# Patient Record
Sex: FEMALE | Race: WHITE | NOT HISPANIC OR LATINO | Employment: FULL TIME | URBAN - METROPOLITAN AREA
[De-identification: names, ages, dates, MRNs, and addresses within clinical notes are randomized per-mention and may not be internally consistent; named-entity substitution may affect disease eponyms.]

---

## 2018-04-22 LAB — RAPID GROUP A STREP (OHS): NEGATIVE

## 2019-03-16 LAB
RAPID GROUP A STREP (OHS): NEGATIVE
RAPID GROUP A STREP (OHS): NEGATIVE

## 2022-04-11 ENCOUNTER — HISTORICAL (OUTPATIENT)
Dept: ADMINISTRATIVE | Facility: HOSPITAL | Age: 47
End: 2022-04-11

## 2022-04-27 VITALS
DIASTOLIC BLOOD PRESSURE: 66 MMHG | OXYGEN SATURATION: 100 % | WEIGHT: 121.5 LBS | BODY MASS INDEX: 24.49 KG/M2 | HEIGHT: 59 IN | SYSTOLIC BLOOD PRESSURE: 100 MMHG

## 2022-09-21 ENCOUNTER — HISTORICAL (OUTPATIENT)
Dept: ADMINISTRATIVE | Facility: HOSPITAL | Age: 47
End: 2022-09-21

## 2022-09-22 ENCOUNTER — HISTORICAL (OUTPATIENT)
Dept: ADMINISTRATIVE | Facility: HOSPITAL | Age: 47
End: 2022-09-22

## 2023-02-20 ENCOUNTER — OFFICE VISIT (OUTPATIENT)
Dept: URGENT CARE | Facility: CLINIC | Age: 48
End: 2023-02-20
Payer: COMMERCIAL

## 2023-02-20 VITALS
WEIGHT: 121 LBS | HEIGHT: 59 IN | HEART RATE: 80 BPM | BODY MASS INDEX: 24.39 KG/M2 | SYSTOLIC BLOOD PRESSURE: 116 MMHG | RESPIRATION RATE: 18 BRPM | OXYGEN SATURATION: 100 % | DIASTOLIC BLOOD PRESSURE: 81 MMHG | TEMPERATURE: 98 F

## 2023-02-20 DIAGNOSIS — M79.644 FINGER PAIN, RIGHT: Primary | ICD-10-CM

## 2023-02-20 PROCEDURE — 99203 OFFICE O/P NEW LOW 30 MIN: CPT | Mod: ,,, | Performed by: PHYSICIAN ASSISTANT

## 2023-02-20 PROCEDURE — 1160F RVW MEDS BY RX/DR IN RCRD: CPT | Mod: CPTII,,, | Performed by: PHYSICIAN ASSISTANT

## 2023-02-20 PROCEDURE — 1159F PR MEDICATION LIST DOCUMENTED IN MEDICAL RECORD: ICD-10-PCS | Mod: CPTII,,, | Performed by: PHYSICIAN ASSISTANT

## 2023-02-20 PROCEDURE — 3074F PR MOST RECENT SYSTOLIC BLOOD PRESSURE < 130 MM HG: ICD-10-PCS | Mod: CPTII,,, | Performed by: PHYSICIAN ASSISTANT

## 2023-02-20 PROCEDURE — 3079F PR MOST RECENT DIASTOLIC BLOOD PRESSURE 80-89 MM HG: ICD-10-PCS | Mod: CPTII,,, | Performed by: PHYSICIAN ASSISTANT

## 2023-02-20 PROCEDURE — 3008F PR BODY MASS INDEX (BMI) DOCUMENTED: ICD-10-PCS | Mod: CPTII,,, | Performed by: PHYSICIAN ASSISTANT

## 2023-02-20 PROCEDURE — 1159F MED LIST DOCD IN RCRD: CPT | Mod: CPTII,,, | Performed by: PHYSICIAN ASSISTANT

## 2023-02-20 PROCEDURE — 99203 PR OFFICE/OUTPT VISIT, NEW, LEVL III, 30-44 MIN: ICD-10-PCS | Mod: ,,, | Performed by: PHYSICIAN ASSISTANT

## 2023-02-20 PROCEDURE — 1160F PR REVIEW ALL MEDS BY PRESCRIBER/CLIN PHARMACIST DOCUMENTED: ICD-10-PCS | Mod: CPTII,,, | Performed by: PHYSICIAN ASSISTANT

## 2023-02-20 PROCEDURE — 3079F DIAST BP 80-89 MM HG: CPT | Mod: CPTII,,, | Performed by: PHYSICIAN ASSISTANT

## 2023-02-20 PROCEDURE — 3008F BODY MASS INDEX DOCD: CPT | Mod: CPTII,,, | Performed by: PHYSICIAN ASSISTANT

## 2023-02-20 PROCEDURE — 3074F SYST BP LT 130 MM HG: CPT | Mod: CPTII,,, | Performed by: PHYSICIAN ASSISTANT

## 2023-02-20 RX ORDER — CLINDAMYCIN HYDROCHLORIDE 300 MG/1
300 CAPSULE ORAL EVERY 8 HOURS
Qty: 30 CAPSULE | Refills: 0 | Status: SHIPPED | OUTPATIENT
Start: 2023-02-20 | End: 2023-03-02

## 2023-02-20 RX ORDER — ZOLPIDEM TARTRATE 10 MG/1
10 TABLET ORAL NIGHTLY
COMMUNITY
Start: 2023-01-30

## 2023-02-20 RX ORDER — DOXYCYCLINE 100 MG/1
100 CAPSULE ORAL
COMMUNITY
Start: 2023-02-17 | End: 2023-02-27

## 2023-02-20 RX ORDER — DEXBROMPHENIRAMINE MALEATE, PHENYLEPHRINE HYDROCHLORIDE 2; 7.5 MG/1; MG/1
1 TABLET ORAL EVERY 6 HOURS PRN
COMMUNITY
Start: 2023-02-17

## 2023-02-20 RX ORDER — PREDNISONE 20 MG/1
20 TABLET ORAL 2 TIMES DAILY
Qty: 10 TABLET | Refills: 0 | Status: SHIPPED | OUTPATIENT
Start: 2023-02-20 | End: 2023-02-25

## 2023-02-20 NOTE — PROGRESS NOTES
"Subjective:       Patient ID: Desire Hylton is a 47 y.o. female.    Vitals:  height is 4' 11" (1.499 m) and weight is 54.9 kg (121 lb). Her temperature is 98.2 °F (36.8 °C). Her blood pressure is 116/81 and her pulse is 80. Her respiration is 18 and oxygen saturation is 100%.     Chief Complaint: Finger Pain    47 y.o. female presents to clinic w/ c/o redness, stiffness, and swelling of 5th digit of her R hand x14h.  She does report pain extending into the forearm.  Alleviating factors used include Advil w/ no improvement.  She denies any fever.  She denies joint pain elsewhere in the body.  She denies any known specific trauma or puncture wounds.    Finger Pain    Skin:  Positive for erythema.     Objective:      Physical Exam   HENT:   Head: Normocephalic and atraumatic.   Neck: Neck supple.   Abdominal: Normal appearance.   Musculoskeletal:         General: Swelling and tenderness present.   Neurological: She is alert.   Skin: erythema No lesion         Right upper extremity:  Right 5th digit there is erythema and tenderness extending from the PIP joint to the distal aspect of the finger.  Secondary to pain and swelling.  Source soft.  Capillary refill is brisk distally.  I do not see any erythema extending into the hand or forearm.  Patient denies pain in the proximal aspect of the flexor extensor tendons.  She is not tender along the tendon sheath into the hand.        X-rays: No observed acute fracture.  No observed foreign body.  Awaiting radiology over-read.       Previous History      Review of patient's allergies indicates:   Allergen Reactions    Penicillins        Past Medical History:   Diagnosis Date    Known health problems: none      Current Outpatient Medications   Medication Instructions    ALAHIST PE 2-7.5 mg Tab 1 tablet, Oral, Every 6 hours PRN    clindamycin (CLEOCIN) 300 mg, Oral, Every 8 hours    doxycycline (VIBRAMYCIN) 100 mg, Oral    predniSONE (DELTASONE) 20 mg, Oral, 2 times daily    " "zolpidem (AMBIEN) 10 mg, Oral, Nightly     Past Surgical History:   Procedure Laterality Date    NO PAST SURGERIES       Family History   Problem Relation Age of Onset    No Known Problems Mother     No Known Problems Father     No Known Problems Sister     No Known Problems Brother        Social History     Tobacco Use    Smoking status: Never    Smokeless tobacco: Never   Substance Use Topics    Alcohol use: Yes    Drug use: Never        Physical Exam      Vital Signs Reviewed   /81   Pulse 80   Temp 98.2 °F (36.8 °C)   Resp 18   Ht 4' 11" (1.499 m)   Wt 54.9 kg (121 lb)   SpO2 100%   BMI 24.44 kg/m²        Procedures    Procedures     Labs     Results for orders placed or performed in visit on 09/22/22   POCT rapid strep A   Result Value Ref Range    Rapid Group A Strep Negative      Assessment:       1. Finger pain, right          Plan:         Finger pain, right  -     XR HAND COMPLETE 3 VIEW RIGHT; Future; Expected date: 02/20/2023  -     clindamycin (CLEOCIN) 300 MG capsule; Take 1 capsule (300 mg total) by mouth every 8 (eight) hours. for 10 days  Dispense: 30 capsule; Refill: 0  -     predniSONE (DELTASONE) 20 MG tablet; Take 1 tablet (20 mg total) by mouth 2 (two) times daily. for 5 days  Dispense: 10 tablet; Refill: 0    .   Pain: Take OTC Tylenol or Ibuprofen per package instructions as needed for pain.    Follow up with up here tomorrow for a recheck.   Present to the Emergency Department for any significant change or worsening symptoms including worsening redness, swelling, purulent discharge, fever, body aches, or chills.                   "

## 2023-02-20 NOTE — PATIENT INSTRUCTIONS
Pain: Take OTC Tylenol or Ibuprofen per package instructions as needed for pain.    Follow up with up here tomorrow for a recheck.   Present to the Emergency Department for any significant change or worsening symptoms including worsening redness, swelling, purulent discharge, fever, body aches, or chills.

## 2023-11-06 ENCOUNTER — OFFICE VISIT (OUTPATIENT)
Dept: URGENT CARE | Facility: CLINIC | Age: 48
End: 2023-11-06
Payer: COMMERCIAL

## 2023-11-06 VITALS
RESPIRATION RATE: 18 BRPM | TEMPERATURE: 99 F | OXYGEN SATURATION: 96 % | SYSTOLIC BLOOD PRESSURE: 103 MMHG | HEART RATE: 57 BPM | DIASTOLIC BLOOD PRESSURE: 70 MMHG

## 2023-11-06 DIAGNOSIS — J02.9 SORE THROAT: Primary | ICD-10-CM

## 2023-11-06 DIAGNOSIS — J02.9 PHARYNGITIS, UNSPECIFIED ETIOLOGY: ICD-10-CM

## 2023-11-06 LAB
CTP QC/QA: YES
MOLECULAR STREP A: NEGATIVE

## 2023-11-06 PROCEDURE — 99213 PR OFFICE/OUTPT VISIT, EST, LEVL III, 20-29 MIN: ICD-10-PCS | Mod: ,,, | Performed by: NURSE PRACTITIONER

## 2023-11-06 PROCEDURE — 87651 STREP A DNA AMP PROBE: CPT | Mod: QW,,, | Performed by: NURSE PRACTITIONER

## 2023-11-06 PROCEDURE — 99213 OFFICE O/P EST LOW 20 MIN: CPT | Mod: ,,, | Performed by: NURSE PRACTITIONER

## 2023-11-06 PROCEDURE — 87651 POCT STREP A MOLECULAR: ICD-10-PCS | Mod: QW,,, | Performed by: NURSE PRACTITIONER

## 2023-11-06 NOTE — PROGRESS NOTES
Subjective:      Patient ID: Desire Hylton is a 47 y.o. female.    Vitals:  temperature is 98.7 °F (37.1 °C). Her blood pressure is 103/70 and her pulse is 57 (abnormal). Her respiration is 18 and oxygen saturation is 96%.     Chief Complaint: Sore Throat (Sore throat, glands hurt and HA started Saturday)    This is a 46-year-old female presents to urgent care with a 2 day history of sore throat headache without fever.  Denies any postnasal drip cough congestion runny nose body aches or any other upper respiratory symptoms.  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  Denies any other symptoms or issues.        HENT:  Positive for sore throat.       Objective:     Physical Exam   Constitutional: She is oriented to person, place, and time. She appears well-developed. She is cooperative.  Non-toxic appearance. She does not appear ill. No distress.   HENT:   Head: Normocephalic and atraumatic.   Ears:   Right Ear: Hearing, tympanic membrane, external ear and ear canal normal.   Left Ear: Hearing, tympanic membrane, external ear and ear canal normal.   Nose: Nose normal. No mucosal edema, rhinorrhea or nasal deformity. No epistaxis. Right sinus exhibits no maxillary sinus tenderness and no frontal sinus tenderness. Left sinus exhibits no maxillary sinus tenderness and no frontal sinus tenderness.   Mouth/Throat: Uvula is midline, oropharynx is clear and moist and mucous membranes are normal. No trismus in the jaw. Normal dentition. No uvula swelling. No oropharyngeal exudate, posterior oropharyngeal edema or posterior oropharyngeal erythema. Tonsils are 0 on the right. Tonsils are 0 on the left. No tonsillar exudate.   Eyes: Conjunctivae and lids are normal. No scleral icterus.   Neck: Trachea normal and phonation normal. Neck supple. No edema present. No erythema present. No neck rigidity present.   Cardiovascular: Normal rate, regular rhythm, normal heart sounds and normal  pulses.   Pulmonary/Chest: Effort normal and breath sounds normal. No respiratory distress. She has no decreased breath sounds. She has no rhonchi.   Abdominal: Normal appearance.   Musculoskeletal: Normal range of motion.         General: No deformity. Normal range of motion.   Neurological: She is alert and oriented to person, place, and time. She exhibits normal muscle tone. Coordination normal.   Skin: Skin is warm, dry, intact, not diaphoretic and not pale.   Psychiatric: Her speech is normal and behavior is normal. Judgment and thought content normal.   Nursing note and vitals reviewed.         Previous History      Review of patient's allergies indicates:   Allergen Reactions    Penicillins        Past Medical History:   Diagnosis Date    Known health problems: none      Current Outpatient Medications   Medication Instructions    ALAHIST PE 2-7.5 mg Tab 1 tablet, Oral, Every 6 hours PRN    zolpidem (AMBIEN) 10 mg, Oral, Nightly     Past Surgical History:   Procedure Laterality Date    CHOLECYSTECTOMY      NO PAST SURGERIES           Social History     Tobacco Use    Smoking status: Never    Smokeless tobacco: Never   Substance Use Topics    Alcohol use: Yes    Drug use: Never        Physical Exam      Vital Signs Reviewed   /70   Pulse (!) 57   Temp 98.7 °F (37.1 °C)   Resp 18   SpO2 96%        Procedures    Procedures     Labs     Results for orders placed or performed in visit on 11/06/23   POCT Strep A, Molecular   Result Value Ref Range    Molecular Strep A, POC Negative Negative     Acceptable Yes         Assessment:     1. Sore throat    2. Pharyngitis, unspecified etiology        Plan:   Negative strep will use over-the-counter Tylenol and ibuprofen for symptom management monitor for any new onset of fever or worsening symptoms and will be    Sore throat  -     POCT Strep A, Molecular    Pharyngitis, unspecified etiology

## 2023-11-06 NOTE — PATIENT INSTRUCTIONS
Negative strep continue using Tylenol ibuprofen for pain control  Drink plenty of fluids monitor for any new onset of fever or worsening sore throat. If This does occur please be re-evaluated   Call with any questions or concerns otherwise follow up with the PCP as needed.

## 2024-11-13 ENCOUNTER — OFFICE VISIT (OUTPATIENT)
Dept: URGENT CARE | Facility: CLINIC | Age: 49
End: 2024-11-13
Payer: COMMERCIAL

## 2024-11-13 VITALS
DIASTOLIC BLOOD PRESSURE: 68 MMHG | SYSTOLIC BLOOD PRESSURE: 101 MMHG | HEIGHT: 59 IN | RESPIRATION RATE: 18 BRPM | TEMPERATURE: 97 F | WEIGHT: 103 LBS | HEART RATE: 69 BPM | BODY MASS INDEX: 20.76 KG/M2 | OXYGEN SATURATION: 99 %

## 2024-11-13 DIAGNOSIS — S16.1XXA CERVICAL STRAIN, ACUTE, INITIAL ENCOUNTER: Primary | ICD-10-CM
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
## 2024-11-13 DIAGNOSIS — G44.201 ACUTE INTRACTABLE TENSION-TYPE HEADACHE: ICD-10-CM

## 2024-11-13 DIAGNOSIS — M79.18 MUSCULOSKELETAL PAIN: ICD-10-CM

## 2024-11-13 PROCEDURE — 96372 THER/PROPH/DIAG INJ SC/IM: CPT | Mod: ,,, | Performed by: NURSE PRACTITIONER

## 2024-11-13 PROCEDURE — 99213 OFFICE O/P EST LOW 20 MIN: CPT | Mod: 25,,, | Performed by: NURSE PRACTITIONER

## 2024-11-13 RX ORDER — CYCLOBENZAPRINE HCL 10 MG
10 TABLET ORAL 3 TIMES DAILY PRN
Qty: 30 TABLET | Refills: 0 | Status: SHIPPED | OUTPATIENT
Start: 2024-11-13 | End: 2024-11-23

## 2024-11-13 RX ORDER — KETOROLAC TROMETHAMINE 30 MG/ML
60 INJECTION, SOLUTION INTRAMUSCULAR; INTRAVENOUS
Status: COMPLETED | OUTPATIENT
Start: 2024-11-13 | End: 2024-11-13

## 2024-11-13 RX ORDER — MELOXICAM 15 MG/1
15 TABLET ORAL DAILY
Qty: 14 TABLET | Refills: 0 | Status: SHIPPED | OUTPATIENT
Start: 2024-11-13 | End: 2024-11-27

## 2024-11-13 RX ADMIN — KETOROLAC TROMETHAMINE 60 MG: 30 INJECTION, SOLUTION INTRAMUSCULAR; INTRAVENOUS at 01:11
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
## 2025-08-14 ENCOUNTER — OFFICE VISIT (OUTPATIENT)
Dept: URGENT CARE | Facility: CLINIC | Age: 50
End: 2025-08-14
Payer: COMMERCIAL

## 2025-08-14 VITALS
WEIGHT: 105 LBS | BODY MASS INDEX: 21.17 KG/M2 | HEART RATE: 97 BPM | TEMPERATURE: 98 F | HEIGHT: 59 IN | RESPIRATION RATE: 18 BRPM | OXYGEN SATURATION: 98 % | DIASTOLIC BLOOD PRESSURE: 65 MMHG | SYSTOLIC BLOOD PRESSURE: 94 MMHG

## 2025-08-14 DIAGNOSIS — J02.9 SORE THROAT: Primary | ICD-10-CM

## 2025-08-14 DIAGNOSIS — B34.9 VIRAL ILLNESS: ICD-10-CM

## 2025-08-14 LAB
CTP QC/QA: YES
MOLECULAR STREP A: NEGATIVE

## 2025-08-14 RX ORDER — BETAMETHASONE SODIUM PHOSPHATE AND BETAMETHASONE ACETATE 3; 3 MG/ML; MG/ML
9 INJECTION, SUSPENSION INTRA-ARTICULAR; INTRALESIONAL; INTRAMUSCULAR; SOFT TISSUE
Status: COMPLETED | OUTPATIENT
Start: 2025-08-14 | End: 2025-08-14

## 2025-08-14 RX ADMIN — BETAMETHASONE SODIUM PHOSPHATE AND BETAMETHASONE ACETATE 9 MG: 3; 3 INJECTION, SUSPENSION INTRA-ARTICULAR; INTRALESIONAL; INTRAMUSCULAR; SOFT TISSUE at 12:08
